# Patient Record
Sex: FEMALE | Race: ASIAN
[De-identification: names, ages, dates, MRNs, and addresses within clinical notes are randomized per-mention and may not be internally consistent; named-entity substitution may affect disease eponyms.]

---

## 2023-04-26 ENCOUNTER — APPOINTMENT (OUTPATIENT)
Dept: OTOLARYNGOLOGY | Facility: CLINIC | Age: 38
End: 2023-04-26
Payer: COMMERCIAL

## 2023-04-26 DIAGNOSIS — R07.0 PAIN IN THROAT: ICD-10-CM

## 2023-04-26 DIAGNOSIS — Z78.9 OTHER SPECIFIED HEALTH STATUS: ICD-10-CM

## 2023-04-26 DIAGNOSIS — J03.00 ACUTE STREPTOCOCCAL TONSILLITIS, UNSPECIFIED: ICD-10-CM

## 2023-04-26 DIAGNOSIS — F17.200 NICOTINE DEPENDENCE, UNSPECIFIED, UNCOMPLICATED: ICD-10-CM

## 2023-04-26 DIAGNOSIS — J35.01 CHRONIC TONSILLITIS: ICD-10-CM

## 2023-04-26 PROBLEM — Z00.00 ENCOUNTER FOR PREVENTIVE HEALTH EXAMINATION: Status: ACTIVE | Noted: 2023-04-26

## 2023-04-26 PROCEDURE — 31575 DIAGNOSTIC LARYNGOSCOPY: CPT | Mod: 52

## 2023-04-26 PROCEDURE — 99204 OFFICE O/P NEW MOD 45 MIN: CPT | Mod: 25

## 2023-04-26 RX ORDER — SPIRONOLACTONE 50 MG/1
50 TABLET ORAL
Refills: 0 | Status: ACTIVE | COMMUNITY

## 2023-04-26 RX ORDER — METHYLPREDNISOLONE 4 MG/1
4 TABLET ORAL
Qty: 1 | Refills: 0 | Status: ACTIVE | COMMUNITY
Start: 2023-04-26 | End: 1900-01-01

## 2023-04-27 ENCOUNTER — TRANSCRIPTION ENCOUNTER (OUTPATIENT)
Age: 38
End: 2023-04-27

## 2023-05-09 NOTE — REASON FOR VISIT
[Initial Evaluation] : an initial evaluation for [FreeTextEntry2] : Persistent tonsillitis, sore throat and history of streptococcus for the past couple of months.    Patient states her level of severity is a level 7 out of 10 and it occurs constant.  Patient states nothing helps to improve or worsens her Persistent tonsillitis, sore throat and history of streptococcus for the past couple of months

## 2023-05-09 NOTE — HISTORY OF PRESENT ILLNESS
[de-identified] : 37 years old female patient with history of Persistent tonsillitis, sore throat and history of streptococcus for the past couple of months.  Patient is present today in the office with Lingual Tonsil Hypertrophy.  Deep cryptic tonsil.  Patient with history of smoking since 2007.  Patient states she smokes 1 cigarette per month.

## 2023-05-09 NOTE — PROCEDURE
[Image(s) Captured] : image(s) captured and filed [Topical Lidocaine] : topical lidocaine [Oxymetazoline HCl] : oxymetazoline HCl [Flexible Endoscope] : examined with the flexible endoscope [Serial Number: ___] : Serial Number: [unfilled] [de-identified] :  Lingual Tonsil Hypertrophy.  Deep cryptic tonsil

## 2023-05-09 NOTE — PHYSICAL EXAM
[Normal] : temporomandibular joint is normal [] : septum deviated bilaterally [de-identified] :  Lingual Tonsil Hypertrophy.  Deep cryptic tonsil

## 2023-05-09 NOTE — CONSULT LETTER
[Dear  ___] : Dear  [unfilled], [Consult Letter:] : I had the pleasure of evaluating your patient, [unfilled]. [Please see my note below.] : Please see my note below. [Consult Closing:] : Thank you very much for allowing me to participate in the care of this patient.  If you have any questions, please do not hesitate to contact me. [Sincerely,] : Sincerely, [FreeTextEntry3] : Steffen Estrada MD, FACS\par Professor of Otolaryngology, [facility] [employer] at [facility]\par Director, [facility], Department of Otolaryngology, [facility]\par , Head & Neck Service Line, [facility]\par

## 2023-06-28 ENCOUNTER — APPOINTMENT (OUTPATIENT)
Dept: OTOLARYNGOLOGY | Facility: CLINIC | Age: 38
End: 2023-06-28
Payer: COMMERCIAL

## 2023-06-28 VITALS
WEIGHT: 128 LBS | HEIGHT: 64 IN | OXYGEN SATURATION: 96 % | BODY MASS INDEX: 21.85 KG/M2 | HEART RATE: 85 BPM | SYSTOLIC BLOOD PRESSURE: 102 MMHG | DIASTOLIC BLOOD PRESSURE: 69 MMHG | TEMPERATURE: 98 F

## 2023-06-28 DIAGNOSIS — K21.9 GASTRO-ESOPHAGEAL REFLUX DISEASE W/OUT ESOPHAGITIS: ICD-10-CM

## 2023-06-28 DIAGNOSIS — J03.90 ACUTE TONSILLITIS, UNSPECIFIED: ICD-10-CM

## 2023-06-28 DIAGNOSIS — J02.9 ACUTE PHARYNGITIS, UNSPECIFIED: ICD-10-CM

## 2023-06-28 DIAGNOSIS — J35.8 OTHER CHRONIC DISEASES OF TONSILS AND ADENOIDS: ICD-10-CM

## 2023-06-28 DIAGNOSIS — J35.1 HYPERTROPHY OF TONSILS: ICD-10-CM

## 2023-06-28 PROCEDURE — 31575 DIAGNOSTIC LARYNGOSCOPY: CPT

## 2023-06-28 PROCEDURE — 99213 OFFICE O/P EST LOW 20 MIN: CPT | Mod: 25

## 2023-06-28 RX ORDER — AMOXICILLIN AND CLAVULANATE POTASSIUM 875; 125 MG/1; MG/1
875-125 TABLET, COATED ORAL
Qty: 14 | Refills: 0 | Status: ACTIVE | COMMUNITY
Start: 2023-06-28 | End: 1900-01-01

## 2023-06-28 RX ORDER — OMEPRAZOLE 40 MG/1
40 CAPSULE, DELAYED RELEASE ORAL TWICE DAILY
Qty: 60 | Refills: 6 | Status: ACTIVE | COMMUNITY
Start: 2023-06-28 | End: 1900-01-01

## 2023-06-28 NOTE — HISTORY OF PRESENT ILLNESS
[de-identified] : 37 years old female patient with history of Persistent tonsillitis, sore throat and history of streptococcus for the past couple of months.  Patient is present today in the office with Lingual Tonsil Hypertrophy.  Deep cryptic tonsil. Severe GERD pharyngis, Laryngis.  Patient with history of smoking since 2007.  Patient states she smokes 1 cigarette per month.

## 2023-06-28 NOTE — CONSULT LETTER
[Please see my note below.] : Please see my note below. [Consult Closing:] : Thank you very much for allowing me to participate in the care of this patient.  If you have any questions, please do not hesitate to contact me. [Sincerely,] : Sincerely, [FreeTextEntry3] : Steffen Estrada MD, FACS\par Professor of Otolaryngology, [facility] [employer] at [facility]\par Director, [facility], Department of Otolaryngology, [facility] [place]\par , Head & Neck Service Line, [facility]\par

## 2023-06-28 NOTE — PROCEDURE
[Image(s) Captured] : image(s) captured and filed [Topical Lidocaine] : topical lidocaine [Oxymetazoline HCl] : oxymetazoline HCl [Flexible Endoscope] : examined with the flexible endoscope [Serial Number: ___] : Serial Number: [unfilled] [Glottis Arytenoid Cartilages Erythema] : bilateral arytenoid ~M erythema [de-identified] :  Lingual Tonsil Hypertrophy.  Deep cryptic tonsil \par \par  Severe GERD pharyngis, Laryngis.

## 2023-06-28 NOTE — REASON FOR VISIT
[Subsequent Evaluation] : a subsequent evaluation for [FreeTextEntry2] : Persistent tonsillitis, sore throat and history of streptococcus for the past couple of months.

## 2023-06-28 NOTE — PHYSICAL EXAM
[] : septum deviated bilaterally [Normal] : no rashes [de-identified] :  Lingual Tonsil Hypertrophy.  Deep cryptic tonsil